# Patient Record
Sex: MALE | Race: WHITE | NOT HISPANIC OR LATINO | Employment: OTHER | URBAN - METROPOLITAN AREA
[De-identification: names, ages, dates, MRNs, and addresses within clinical notes are randomized per-mention and may not be internally consistent; named-entity substitution may affect disease eponyms.]

---

## 2018-01-23 ENCOUNTER — ALLSCRIPTS OFFICE VISIT (OUTPATIENT)
Dept: OTHER | Facility: OTHER | Age: 38
End: 2018-01-23

## 2018-01-23 ENCOUNTER — TRANSCRIBE ORDERS (OUTPATIENT)
Dept: ADMINISTRATIVE | Facility: HOSPITAL | Age: 38
End: 2018-01-23

## 2018-01-23 DIAGNOSIS — G40.409 CENTRENCEPHALIC EPILEPSY (HCC): ICD-10-CM

## 2018-01-23 DIAGNOSIS — Q03.1 DANDY-WALKER SYNDROME (HCC): Primary | ICD-10-CM

## 2018-01-23 DIAGNOSIS — Q04.6 CONGENITAL CEREBRAL CYST (HCC): ICD-10-CM

## 2018-01-23 DIAGNOSIS — R41.3 MEMORY LOSS: ICD-10-CM

## 2018-01-24 NOTE — PROGRESS NOTES
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)    Plan   Verl Clas cyst    · Follow-up visit in 6 months Evaluation and Treatment  Follow-up  Status: Hold For -    Scheduling  Requested for: [id number]   Ordered; For: Verl Clas cyst; Ordered By: Divya Rodriguez Performed:  Due: [id number]  Verl Clas cyst, Memory loss, Myoclonic seizure, Porencephalic cyst    · EEG AWAKE (ROUTINE); Status:Hold For - Scheduling; Requested EMW:[id number]; Perform:[facility]; UDM:[id number];SCDKUGZ; For:Dandy Walker cyst, Memory loss, Myoclonic seizure, Porencephalic cyst; Ordered By:Erma Subramanian;  Verl Clas cyst, Memory loss, Porencephalic cyst    · * MRI BRAIN WO CONTRAST; Status:Need Information - Financial Authorization; Requested YZN:[id number]; Perform:[facility]; RRM:[id number];ISDZHGA; For:Dandy Walker cyst, Memory loss, Porencephalic cyst; Ordered By:Erma Subramanian;  Memory loss    · Memantine HCl - 5 MG Oral Tablet; Take 1 tablet in morning and 1 tablet at bedtime   Rx By: Divya Rodriguez; Dispense: 0 Days ; #:60 Tablet; Refill: 3;For: Memory loss; LADAN = N; Verified Transmission to [email or web address] Last Updated By: System, [employer]; 1/23/2018 3:29:18 PM    Discussion/Summary   Discussion Summary:    - MRI of head without contrast   EEG ordered for her  Namenda 5mg twice a day He is trying to wean off of smoking  Talked about chantix but at this time he will try to wean off of it own his own  Chief Complaint   Chief Complaint Free Text Note Form: Patient presents today for a neurological follow up regarding memory loss      History of Present Illness   HPI: We had the pleasure of evaluating Herson Sergio in neurological follow up today  As you know he is a 38year old right handed male   He is here today with his father for evaluation by neurology for his Josel Edouard Cyst and headaches  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  Smoking:  He is smoking about pack a day for many years now  He was given chantex by her pcp but never took it as his father was worried about the side effects  He has noted change in weather will bring on a headache and taking Excedrin will abort the headaches  He has not had a severe headache for a while and last headaches was few weeks ago  It starts off from the occipital region and radiates to the frontal region  No nausea, light or sound sensitivity  It tends to occur in his shoulder and at time in his hands  It tends to occur at least 2-3 times a week or at time it may not occur for a while  He has noted it will last for seconds  Intermittent in nature with no precipitating factors  He has no confusion, no staring spells  At time father has noted it starts off with a myoclonus in his upper arm and moves to his hand  is good  No falls to report  He does have to write things down and has noted short term memory issues  But overall doing well  Father did have to help him out with his history today  Will start him on Namenda today  in his hands  That has improved since he was last seen  in 2009 showed mild median nerve entrapment  of brain: 2013 â  Large CSF signal cyst which communicates with the anterior right lateral ventricle measuring 10 cm in AP diameter and 6 9 cm in transverse diameter, suggestive of porencephalic cyst  The adjacent brain parenchyma demonstrates normal signal  This is nonspecific and could represent a remote vascular insult or developmental abnormality  2  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   The left cerebellar hemisphere is smaller than the right which may be related to adjacent arachnoid cyst  Findings suggest sequela of Dandy-Walker continuum, either hypoplastic vermis with rotation or persistent Jimi pouch cyst â 10/28/2013 - IMPRESSION Abnormal electroencephalogram due to the presence of:  Jaspal and wave discharges emanating from the right hemisphere  This occurs without any clinical warning  Background slowing and attenuation noted in the right hemisphere  Intermittent slowing also occurring in the central region  This study does demonstrate a focal abnormality involving the right hemisphere with superimposed swelling in that region  Review of Systems   Neurological ROS:      Constitutional: no fever, no chills, no recent weight gain, no recent weight loss, no complaints of feeling tired, no changes in appetite  HEENT:  no sinus problems, not feeling congested, no blurred vision, no dryness of the eyes, no eye pain, no hearing loss, no tinnitus, no mouth sores, no sore throat, no hoarseness, no dysphagia, no masses, no bleeding  Cardiovascular:  no chest pain or pressure, no palpitations present, the heart rate was not rapid or irregular, no swelling in the arms or legs, no poor circulation  Respiratory:  no unusual or persistant cough, no shortness of breath with or without exertion  Gastrointestinal:  no nausea, no vomiting, no diarrhea, no abdominal pain, no changes in bowel habits, no melena, no loss of bowel control  Genitourinary:  no incontinence, no feelings of urinary urgency, no increase in frequency, no urinary hesitancy, no dysuria, no hematuria  Musculoskeletal:  no arthralgias, no myalgias, no immobility or loss of function, no head/neck/back pain, no pain while walking  Integumentary  no masses, no rash, no skin lesions, no livedo reticularis  Psychiatric:  no anxiety, no depression, no mood swings, no psychiatric hospitalizations, no sleep problems  Endocrine   no unusual weight loss or gain, no excessive urination, no excessive thirst, no hair loss or gain, no hot or cold intolerance, no menstrual period change or irregularity, no loss of sexual ability or drive, no erection difficulty, no nipple discharge  Hematologic/Lymphatic:  no unusual bleeding, no tendency for easy bruising, no clotting skin or lumps  Neurological General:  no headache, no nausea or vomiting, no lightheadedness, no convulsions, no blackouts, no syncope, no trauma, no photopsia, no increased sleepiness, no trouble falling asleep, no snoring, no awakening at night  Neurological Mental Status:  no confusion, no mood swings, no alteration or loss of consciousness, no difficulty expressing/understanding speech, no memory problems  Neurological Cranial Nerves:  no blurry or double vision, no loss of vision, no face drooping, no facial numbness or weakness, no taste or smell loss/changes, no hearing loss or ringing, no vertigo or dizziness, no dysphagia, no slurred speech  Neurological Motor findings include: tremor  Neurological Coordination:  no unsteadiness, no vertigo or dizziness, no clumsiness, no problems reaching for objects  Neurological Sensory:  no numbness, no pain, no tingling, does not fall when eyes closed or taking a shower  Neurological Gait:  no difficulty walking, not falling to one side, no sensation of being pushed, has not had falls  ROS Reviewed:    ROS reviewed  Active Problems   1  Allergic rhinitis (477 9) (J30 9)   2  Asthma (493 90) (J45 909)   3  Naya Rodriguez cyst (742 3) (Q03 1)   4  Headache (784 0) (R51)   5  Hyperlipidemia (272 4) (E78 5)   6  Memory loss (780 93) (R41 3)   7  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)    Past Medical History   Active Problems And Past Medical History Reviewed: The active problems and past medical history were reviewed and updated today  Surgical History   1  History of Ankle Surgery   2  History of Eye Surgery   3  History of Oral Surgery Tooth Extraction  Surgical History Reviewed: The surgical history was reviewed and updated today         Family History   Mother 1  Family history of Classic Migraine (With Aura)   2  Family history of Diabetes Mellitus (V18 0)  Maternal Grandmother    3  Family history of Diabetes Mellitus (V18 0)   4  Family history of Heart Disease (V17 49)  Paternal Grandmother    5  Family history of Alzheimer Disease   6  Family history of Heart Disease (V17 49)  Paternal Grandfather    7  Family history of Heart Disease (V17 49)   8  Family history of Prostate Cancer (E24 11)  Family History Reviewed: The family history was reviewed and updated today  Social History    · Being A Social Drinker   · Current Every Day Smoker (305 1)   · Current Smoker (305 1)   · Daily Coffee Consumption (2  Cups/Day)   · Denied: History of Drug Use   · Marital History - Single  Social History Reviewed: The social history was reviewed and updated today  Current Meds    1  Cetirizine HCl - 10 MG Oral Tablet; Therapy: (Recorded:10Oct2013) to Recorded   2  Lovaza CAPS; Therapy: (Recorded:17Oct2014) to Recorded   3  Multi-Vitamin TABS; Therapy: (Recorded:10Oct2013) to Recorded   4  Synthroid TABS; patient is unaware of what mg they take  They do know they take 0 5     tablets every day; Therapy: (Recorded:20Nov2015) to Recorded   5  Vitamin D3 1000 UNIT Oral Tablet; Take 1 tablet twice daily; Therapy: (Recorded:17Oct2014) to Recorded   6  Welchol 625 MG Oral Tablet; TAKE 3 TABLETS TWICE DAILY; Therapy: (Recorded:10Oct2013) to Recorded  Medication List Reviewed: The medication list was reviewed and updated today  Allergies   1  No Known Drug Allergies  2   Bee sting    Vitals   Signs   Recorded: 42BIU2383 02:42PM   Heart Rate: 80, L Brachial Artery  Pulse Quality: Normal, L Brachial Artery  Respiration Quality: Normal  Respiration: 18  Systolic: 956, LUE, Sitting  Diastolic: 70, LUE, Sitting  Height: 5 ft 8 in  Weight: 210 lb 6 oz  BMI Calculated: 31 99  BSA Calculated: 2 09    Physical Exam        Constitutional      General appearance: No acute distress, well appearing and well nourished  -- Enlarged head circumference  Eyes      Ophthalmoscopic examination: Vision is grossly normal  Gross visual field testing by confrontation shows no abnormalities  EOMI in both eyes  Conjunctivae clear  Eyelids normal palpebral fissures equal  Orbits exhibit normal position  No discharge from the eyes  PERRL  Musculoskeletal      Gait and station: Normal gait, stance and balance  -- wide base gait  Mild difficulty with tandom gait  Muscle strength: Normal strength throughout  Muscle tone: No atrophy, abnormal movements, flaccidity, cogwheeling or spasticity  Neurologic      Orientation to person, place, and time: Normal  -- MOCA 17/30 11/20/2015, moca 1/23/2018 - 20/30  2nd cranial nerve: Normal        3rd, 4th, and 6th cranial nerve: Abnormal  -- (sustained nystagmus looking to all directions)      5th cranial nerve: Normal        7th cranial nerve: Normal        8th cranial nerve: Normal        9th cranial nerve: Normal        11th cranial nerve: Normal        12th cranial nerve: Normal        Sensation: Normal        Reflexes: Abnormal   Deep tendon reflexes: 3+ right biceps,-- 3+ left biceps,-- 3+ right brachioradialis,-- 3+ left brachioradialis,-- 3+ right patella-- and-- 3+ left patella2+ right ankle jerk-- and-- 2+ left ankle jerk        Coordination: Normal        Mood and affect: Normal        Signatures    Electronically signed by : Cierra Desouza MD; Jan 23 2018  3:35PM EST                       (Author)

## 2018-02-08 ENCOUNTER — HOSPITAL ENCOUNTER (OUTPATIENT)
Dept: MRI IMAGING | Facility: HOSPITAL | Age: 38
Discharge: HOME | End: 2018-02-08
Attending: PSYCHIATRY & NEUROLOGY
Payer: COMMERCIAL

## 2018-02-08 DIAGNOSIS — Q03.1 DANDY-WALKER SYNDROME (HCC): ICD-10-CM

## 2018-02-08 DIAGNOSIS — R41.3 MEMORY LOSS: ICD-10-CM

## 2018-02-08 DIAGNOSIS — Q04.6 CONGENITAL CEREBRAL CYST (HCC): ICD-10-CM

## 2018-02-08 PROCEDURE — 70551 MRI BRAIN STEM W/O DYE: CPT

## 2018-02-15 ENCOUNTER — HOSPITAL ENCOUNTER (OUTPATIENT)
Dept: NEUROLOGY | Facility: AMBULATORY SURGERY CENTER | Age: 38
Discharge: HOME | End: 2018-02-15
Payer: COMMERCIAL

## 2018-02-15 DIAGNOSIS — Q03.1 DANDY-WALKER SYNDROME (HCC): ICD-10-CM

## 2018-02-15 DIAGNOSIS — G40.409 CENTRENCEPHALIC EPILEPSY (HCC): ICD-10-CM

## 2018-02-15 DIAGNOSIS — R41.3 MEMORY LOSS: ICD-10-CM

## 2018-02-15 DIAGNOSIS — Q04.6 CONGENITAL CEREBRAL CYST (HCC): ICD-10-CM

## 2018-02-15 PROCEDURE — 95816 EEG AWAKE AND DROWSY: CPT | Performed by: PSYCHIATRY & NEUROLOGY

## 2018-02-15 PROCEDURE — 95816 EEG AWAKE AND DROWSY: CPT

## 2018-05-01 ENCOUNTER — TELEPHONE (OUTPATIENT)
Dept: NEUROLOGY | Facility: CLINIC | Age: 38
End: 2018-05-01

## 2018-08-01 RX ORDER — MEMANTINE HYDROCHLORIDE 5 MG/1
TABLET ORAL
COMMUNITY
Start: 2018-01-23 | End: 2021-11-29

## 2018-08-01 RX ORDER — GEMFIBROZIL 600 MG/1
600 TABLET, FILM COATED ORAL DAILY
COMMUNITY
Start: 2017-09-19

## 2018-08-01 RX ORDER — BIOTIN 1 MG
1 TABLET ORAL 2 TIMES DAILY
COMMUNITY

## 2018-08-01 RX ORDER — HYDROCODONE BITARTRATE AND ACETAMINOPHEN 5; 325 MG/1; MG/1
1-2 TABLET ORAL EVERY 6 HOURS
COMMUNITY

## 2018-08-01 RX ORDER — OMEGA-3-ACID ETHYL ESTERS 1 G/1
1 CAPSULE, LIQUID FILLED ORAL DAILY
COMMUNITY

## 2018-08-01 RX ORDER — CETIRIZINE HYDROCHLORIDE 10 MG/1
10 TABLET ORAL DAILY
COMMUNITY

## 2018-08-01 RX ORDER — FLUTICASONE PROPIONATE 50 MCG
2 SPRAY, SUSPENSION (ML) NASAL AS NEEDED
COMMUNITY

## 2018-08-01 RX ORDER — COLESEVELAM HYDROCHLORIDE 625 MG/1
3 TABLET, FILM COATED ORAL 2 TIMES DAILY
COMMUNITY

## 2018-08-01 RX ORDER — LEVOTHYROXINE SODIUM 137 UG/1
137 TABLET ORAL DAILY
COMMUNITY

## 2018-08-01 NOTE — PROGRESS NOTES
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  He is going to working every Tuesday  Smoking:   - He is smoking about pack a day for many years now  He was given chantex by her PCP but never took it as his father was worried about the side effects  Headaches:   - He has noted change in weather will bring on a headache and taking Excedrin will abort the headaches  He has not had a severe headache for a while and last headaches was few weeks ago  It starts off from the occipital region and radiates to the frontal region  No nausea, light or sound sensitivity  Myoclonus:   - It tends to occur in his shoulder and at time in his hands  It tends to occur at least 2-3 times a week or at time it may not occur for a while  He has noted it will last for seconds  Intermittent in nature with no precipitating factors  He has no confusion, no staring spells  At time father has noted it starts off with a myoclonus in his upper arm and moves to his hand  Balance is good  No falls to report  Memory:   - He does have to write things down and has noted short term memory issues  But overall doing well  Father did have to help him out with his history today  -  His insurance did not cover Namenda thus patient has not taken it  Is continued to have short-term memory problems  No new concerns  MRI of brain: 2013 - 1   Large CSF signal cyst which communicates with the anterior right lateral ventricle measuring 10 cm in AP diameter and 6 9 cm in transverse diameter, suggestive of porencephalic cyst  The adjacent brain parenchyma demonstrates normal signal  This is nonspecific and could represent a remote vascular insult or developmental abnormality  2  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  The left cerebellar hemisphere is smaller than the right which may be related to adjacent arachnoid cyst  Findings suggest sequela of Dandy-Walker continuum, either hypoplastic vermis with rotation or persistent Jimi pouch cyst     MRI of the brain 2018  IMPRESSION:   Stable MRI of the brain with porencephalic cyst communicating with the right lateral ventricle with asymmetric enlargement of the right cerebral hemisphere  This may be developmental or from remote prior vascular insult    Stable appearance of asymmetric size of the cerebellar hemispheres left smaller than right with left-sided arachnoid cyst noted  Hypoplasia of the cerebellar vermis noted suggesting Dandy-Walker variant or persistent Jimi pouch cyst        EEG - 10/28/2013 - IMPRESSION Abnormal electroencephalogram due to the presence of:   1  Jaspal and wave discharges emanating from the right hemisphere  This occurs without any clinical warning  2  Background slowing and attenuation noted in the right hemisphere  3  Intermittent slowing also occurring in the central region  This study does demonstrate a focal abnormality involving the right hemisphere with superimposed swelling in that region  The following portions of the patient's history were reviewed and updated as appropriate: allergies, current medications, past family history, past medical history, past social history, past surgical history and problem list     Objective:  Blood pressure 131/78, pulse 101, height 5' 8" (1 727 m), weight 95 3 kg (210 lb)      Physical Exam   Constitutional: He appears well-developed  HENT:    In asymmetry of the skull   Eyes: EOM are normal  Pupils are equal, round, and reactive to light  Neck: Normal range of motion  Cardiovascular: Normal rate  Pulmonary/Chest: Effort normal    Musculoskeletal: Normal range of motion  Neurological: He has normal strength  Coordination normal    Skin: Skin is warm  Psychiatric: He has a normal mood and affect  His speech is normal    Nursing note and vitals reviewed  Neurological Exam    Mental Status  The patient is alert  His speech is normal  His language is fluent with no aphasia  He has normal attention span and concentration  [place] 08/02/2018 22/30     Cranial Nerves    CN II: The patient's visual acuity and visual fields are normal   CN III, IV, VI: The patient's pupils are equally round and reactive to light and ocular movements are normal   CN V: The patient has normal facial sensation  CN VII:  The patient has symmetric facial movement  CN VIII:  The patient's hearing is normal   CN IX, X: The patient has symmetric palate movement and normal gag reflex  CN XI: The patient's shoulder shrug strength is normal   CN XII: The patient's tongue is midline without atrophy or fasciculations  (sustained nystagmus looking to all directions)      Motor   His strength is 5/5 throughout all four extremities  Sensory  The patient's sensation is normal in all four extremities  Reflexes   Upper extremity 3+/4   Lower extremity 3+/4     Gait and Coordination  The patient has normal casual, toe, heel, and tandem gait  He has normal coordination bilaterally  Wide-based gait, difficulty with tandem gait  ROS:  Review of Systems  Constitutional: Negative  HENT: Negative  Eyes: Negative  Respiratory: Negative  Cardiovascular: Negative  Gastrointestinal: Negative  Endocrine: Negative  Genitourinary: Negative  Musculoskeletal: Negative  Skin: Negative  Allergic/Immunologic: Negative  Neurological: Positive for tremors (occasionally in upper back )  Hematological: Negative      Psychiatric/Behavioral: Negative

## 2018-08-02 ENCOUNTER — OFFICE VISIT (OUTPATIENT)
Dept: NEUROLOGY | Facility: CLINIC | Age: 38
End: 2018-08-02
Payer: COMMERCIAL

## 2018-08-02 VITALS
SYSTOLIC BLOOD PRESSURE: 131 MMHG | BODY MASS INDEX: 31.83 KG/M2 | HEART RATE: 101 BPM | HEIGHT: 68 IN | DIASTOLIC BLOOD PRESSURE: 78 MMHG | WEIGHT: 210 LBS

## 2018-08-02 DIAGNOSIS — Q04.6 PORENCEPHALIC CYST, CONGENITAL (HCC): ICD-10-CM

## 2018-08-02 DIAGNOSIS — G25.3 MYOCLONUS: ICD-10-CM

## 2018-08-02 DIAGNOSIS — R41.3 MEMORY DIFFICULTY: Primary | ICD-10-CM

## 2018-08-02 DIAGNOSIS — Q03.1 DANDY WALKER MALFORMATION (HCC): ICD-10-CM

## 2018-08-02 PROCEDURE — 99213 OFFICE O/P EST LOW 20 MIN: CPT | Performed by: PSYCHIATRY & NEUROLOGY

## 2018-08-02 NOTE — PROGRESS NOTES
Patient ID: Rosa Rey is a 43 y o  male  Assessment/Plan:    No problem-specific Assessment & Plan notes found for this encounter  {Assess/PlanSmartLinks:18896}       Subjective:    HPI    {[facility] HPI texts:64291}    {Common ambulatory SmartLinks:55738}         Objective:    Blood pressure 131/78, pulse 101, height 5' 8" (1 727 m), weight 95 3 kg (210 lb)  Physical Exam    Neurological Exam      ROS:    Review of Systems   Constitutional: Negative  HENT: Negative  Eyes: Negative  Respiratory: Negative  Cardiovascular: Negative  Gastrointestinal: Negative  Endocrine: Negative  Genitourinary: Negative  Musculoskeletal: Negative  Skin: Negative  Allergic/Immunologic: Negative  Neurological: Positive for tremors (occasionally in upper back )  Hematological: Negative  Psychiatric/Behavioral: Negative

## 2020-10-01 ENCOUNTER — OFFICE VISIT (OUTPATIENT)
Dept: NEUROLOGY | Facility: CLINIC | Age: 40
End: 2020-10-01
Payer: COMMERCIAL

## 2020-10-01 VITALS
TEMPERATURE: 97.9 F | HEIGHT: 68 IN | DIASTOLIC BLOOD PRESSURE: 78 MMHG | HEART RATE: 90 BPM | WEIGHT: 209 LBS | BODY MASS INDEX: 31.67 KG/M2 | SYSTOLIC BLOOD PRESSURE: 134 MMHG

## 2020-10-01 DIAGNOSIS — R41.3 MEMORY DIFFICULTY: ICD-10-CM

## 2020-10-01 DIAGNOSIS — Z72.0 TOBACCO USE: ICD-10-CM

## 2020-10-01 DIAGNOSIS — Q03.1 DANDY WALKER MALFORMATION (HCC): Primary | ICD-10-CM

## 2020-10-01 DIAGNOSIS — Q04.6 PORENCEPHALIC CYST, CONGENITAL (HCC): ICD-10-CM

## 2020-10-01 DIAGNOSIS — G25.3 MYOCLONUS: ICD-10-CM

## 2020-10-01 PROCEDURE — 99214 OFFICE O/P EST MOD 30 MIN: CPT | Performed by: PSYCHIATRY & NEUROLOGY

## 2020-10-23 ENCOUNTER — HOSPITAL ENCOUNTER (OUTPATIENT)
Dept: MRI IMAGING | Facility: HOSPITAL | Age: 40
Discharge: HOME | End: 2020-10-23
Attending: PSYCHIATRY & NEUROLOGY
Payer: COMMERCIAL

## 2020-10-23 DIAGNOSIS — Q03.1 DANDY WALKER MALFORMATION (HCC): ICD-10-CM

## 2020-10-23 PROCEDURE — G1004 CDSM NDSC: HCPCS

## 2020-10-23 PROCEDURE — 70553 MRI BRAIN STEM W/O & W/DYE: CPT

## 2020-10-23 PROCEDURE — A9585 GADOBUTROL INJECTION: HCPCS | Performed by: PSYCHIATRY & NEUROLOGY

## 2020-10-23 RX ADMIN — GADOBUTROL 9 ML: 604.72 INJECTION INTRAVENOUS at 13:02

## 2021-03-05 ENCOUNTER — TELEPHONE (OUTPATIENT)
Dept: NEUROLOGY | Facility: CLINIC | Age: 41
End: 2021-03-05

## 2021-03-05 NOTE — TELEPHONE ENCOUNTER
Received a vm from 3/4/21 at 4:23    Stephanie Calvin calling in requesting to speak to Dr Khloe Chavez  [phone]        Called and spoke to Stephanie Calvin  She received letter that Dr Khloe Chavez is leaving the office  I made her aware that we could reschedule all appts with other providers in our office   She verbalized understanding and will try to find out where Dr Khloe Chavez is going to

## 2021-05-10 ENCOUNTER — TELEPHONE (OUTPATIENT)
Dept: NEUROLOGY | Facility: CLINIC | Age: 41
End: 2021-05-10

## 2021-05-10 NOTE — TELEPHONE ENCOUNTER
Patient's mother, Edward Leger called to ask  for excuse from jury duty for patient  (Dr Digna Bañuelos patient scheduled with you next on 11/29 for f/u)  He has hx of porencephalic cyst, short term memory difficulties and headaches  She said due to his mental status, he will not be able to serve as a juror  Is it okay to write letter stating that he is unable to perform jury duty due to disability indefinitely as this is chronic condition  Please advise, thank you

## 2021-05-11 NOTE — TELEPHONE ENCOUNTER
I reviewed his prior note, agree that jury duty would not be appropriate for him, ok to generate  Thanks

## 2021-11-19 ENCOUNTER — TELEPHONE (OUTPATIENT)
Dept: NEUROLOGY | Facility: CLINIC | Age: 41
End: 2021-11-19

## 2021-11-29 ENCOUNTER — OFFICE VISIT (OUTPATIENT)
Dept: NEUROLOGY | Facility: CLINIC | Age: 41
End: 2021-11-29
Payer: COMMERCIAL

## 2021-11-29 VITALS
TEMPERATURE: 97.4 F | DIASTOLIC BLOOD PRESSURE: 75 MMHG | HEIGHT: 68 IN | SYSTOLIC BLOOD PRESSURE: 140 MMHG | HEART RATE: 94 BPM | WEIGHT: 199.5 LBS | BODY MASS INDEX: 30.23 KG/M2

## 2021-11-29 DIAGNOSIS — G25.3 MYOCLONUS: ICD-10-CM

## 2021-11-29 DIAGNOSIS — R41.3 MEMORY DIFFICULTY: ICD-10-CM

## 2021-11-29 DIAGNOSIS — Q04.6 PORENCEPHALIC CYST, CONGENITAL (HCC): ICD-10-CM

## 2021-11-29 DIAGNOSIS — Q03.1 DANDY WALKER MALFORMATION (HCC): Primary | ICD-10-CM

## 2021-11-29 DIAGNOSIS — Z87.891 FORMER SMOKER: ICD-10-CM

## 2021-11-29 DIAGNOSIS — H93.11 TINNITUS OF RIGHT EAR: ICD-10-CM

## 2021-11-29 PROCEDURE — 99213 OFFICE O/P EST LOW 20 MIN: CPT | Performed by: PSYCHIATRY & NEUROLOGY

## 2024-08-27 ENCOUNTER — OFFICE VISIT (OUTPATIENT)
Dept: NEUROLOGY | Facility: CLINIC | Age: 44
End: 2024-08-27
Payer: COMMERCIAL

## 2024-08-27 VITALS
BODY MASS INDEX: 31.4 KG/M2 | OXYGEN SATURATION: 96 % | SYSTOLIC BLOOD PRESSURE: 134 MMHG | HEIGHT: 68 IN | WEIGHT: 207.2 LBS | HEART RATE: 90 BPM | DIASTOLIC BLOOD PRESSURE: 78 MMHG | TEMPERATURE: 98.2 F

## 2024-08-27 DIAGNOSIS — Q03.1 DANDY WALKER MALFORMATION (HCC): ICD-10-CM

## 2024-08-27 DIAGNOSIS — G47.19 EXCESSIVE DAYTIME SLEEPINESS: ICD-10-CM

## 2024-08-27 DIAGNOSIS — G25.3 MYOCLONUS: ICD-10-CM

## 2024-08-27 DIAGNOSIS — F17.200 SMOKER: ICD-10-CM

## 2024-08-27 DIAGNOSIS — R06.83 SNORING: ICD-10-CM

## 2024-08-27 DIAGNOSIS — R41.3 MEMORY DIFFICULTY: Primary | ICD-10-CM

## 2024-08-27 DIAGNOSIS — Q04.6 PORENCEPHALIC CYST, CONGENITAL (HCC): ICD-10-CM

## 2024-08-27 PROCEDURE — 99213 OFFICE O/P EST LOW 20 MIN: CPT | Performed by: PSYCHIATRY & NEUROLOGY

## 2024-08-27 NOTE — PROGRESS NOTES
Patient ID: Tiago Zamorano is a 44 y.o. male.    Assessment/Plan:   Patient Instructions   Memory challenges: Luis presents for a follow-up evaluation with regard to memory difficulty which may be incident to the Dandy-Walker malformation/porencephalic cyst.  He reports no new challenges at this time.  He continues to be relatively independent, he is driving and working.  -He can continue to use vitamin supplements according to package instructions at his discretion but he should avoid mixing multiple supplements without talking to a pharmacist or nutritionist  -I would recommend he pursue a Mediterranean style diet as this can help support memory long-term  -He should remain physically active and ideally we would recommend cardiovascular exercise several days per week  -He reports a history of snoring and some nonrestorative sleep/excessive daytime sleepiness.  [place] this in mind I will request a home sleep study for him.  If this were to confirm sleep apnea I would refer him to see one of the sleep medicine doctors  -Unfortunately he has returned to smoking but we discussed this in the office today.  He should work on quitting smoking as soon as reasonably possible  -He has intermittent shakes/myoclonic jerks which are not bothersome to him    I will plan for him to return to the office to see me directly in 12 months but we would be happy to see him sooner if the need should arise.  At the time of his follow-up visit we will consider and plan when he will next require MRI imaging.  No problem-specific Assessment & Plan notes found for this encounter.       Diagnoses and all orders for this visit:    Memory difficulty  -     Ambulatory Referral to Sleep Medicine; Future    Snoring  -     Ambulatory Referral to Sleep Medicine; Future    Excessive daytime sleepiness  -     Ambulatory Referral to Sleep Medicine; Future    Smoker    Dandy Walker malformation (HCC)    Porencephalic cyst, congenital  "(HCC)    Myoclonus           Subjective:    HPI    HISTORY AS PER (PT/Family Member):       COGNITION:     Difficulty finding the right word while speaking: No  Requires repeat information or asking the same question repeatedly: No  Fluctuation in alertness: No  Changes in mood or personality:No  Current or previous treatment for depression or anxiety: No     Family member with dementia and what type? Yes Pat Grandmother  History of head trauma: No  Dandy Walker  History of alcohol or substance abuse: No     FUNCTIONAL STATUS:  BADLs  Does patient require assistance with:    Bathing: No  Dressing: No  Transferring: No  Continence: No  Toileting: No  Feeding: No     IADLs  Dose patient require assistance with:    Telephone: No  Shopping: No  Food Preparation: No  Housekeeping: No  Laundry: +/-  Transportation: No  Medications: No  Finances: Yes      The patient presents with his father for a follow-up evaluation.  They note that he is doing reasonably well.  He [place] a part-time job and drives.  He lives independently in an apartment at his parents home.  He reports challenges related to pain in his wrist on the right-hand side.    He does report some sleep issues.  His father confirms that he snores significantly.  He notes nonrefreshing sleep and excessive daytime sleepiness.  Bearing this is by and considering his cognitive challenges there is concern for obstructive sleep apnea.       Objective:    Blood pressure 134/78, pulse 90, temperature 98.2 °F (36.8 °C), temperature source Temporal, height 5' 8\" (1.727 m), weight 94 kg (207 lb 3.2 oz), SpO2 96%.    Physical Exam    Neurological Exam    At the time of my examination he was awake and alert and in no distress.  He is a moderate historian, perhaps slightly childlike.  There is minimal macrocephaly.  Cranial nerves II through XII are symmetrically intact.  There is no focal or lateralizing weakness.  He is able to rise easily without assistance and his gait " is stable.

## 2024-08-27 NOTE — PATIENT INSTRUCTIONS
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  He reports no new challenges at this time.  He continues to be relatively independent, he is driving and working.  -He can continue to use vitamin supplements according to package instructions at his discretion but he should avoid mixing multiple supplements without talking to a pharmacist or nutritionist  -I would recommend he pursue a Mediterranean style diet as this can help support memory long-term  -He should remain physically active and ideally we would recommend cardiovascular exercise several days per week  -He reports a history of snoring and some nonrestorative sleep/excessive daytime sleepiness.  [place] this in mind I will request a home sleep study for him.  If this were to confirm sleep apnea I would refer him to see one of the sleep medicine doctors  -Unfortunately he has returned to smoking but we discussed this in the office today.  He should work on quitting smoking as soon as reasonably possible  -He has intermittent shakes/myoclonic jerks which are not bothersome to him    I will plan for him to return to the office to see me directly in 12 months but we would be happy to see him sooner if the need should arise.  At the time of his follow-up visit we will consider and plan when he will next require MRI imaging.

## 2024-08-27 NOTE — PROGRESS NOTES
Review of Systems   Constitutional:  Negative for appetite change, fatigue and fever.   HENT: Negative.  Negative for hearing loss, tinnitus, trouble swallowing and voice change.    Eyes: Negative.  Negative for photophobia, pain and visual disturbance.   Respiratory: Negative.  Negative for shortness of breath.    Cardiovascular: Negative.  Negative for palpitations.   Gastrointestinal: Negative.  Negative for nausea and vomiting.   Endocrine: Negative.  Negative for cold intolerance.   Genitourinary: Negative.  Negative for dysuria, frequency and urgency.   Musculoskeletal:  Negative for back pain, gait problem, myalgias, neck pain and neck stiffness.   Skin: Negative.  Negative for rash.   Allergic/Immunologic: Negative.    Neurological: Negative.  Negative for dizziness, tremors, seizures, syncope, facial asymmetry, speech difficulty, weakness, light-headedness, numbness and headaches.   Hematological: Negative.  Does not bruise/bleed easily.   Psychiatric/Behavioral: Negative.  Negative for confusion, hallucinations and sleep disturbance.    All other systems reviewed and are negative.       details… Regular rate & rhythm, normal S1, S2; no murmurs, gallops or rubs; no S3, S4

## 2024-09-06 ENCOUNTER — TRANSCRIBE ORDERS (OUTPATIENT)
Dept: SLEEP CENTER | Facility: CLINIC | Age: 44
End: 2024-09-06

## 2024-09-06 DIAGNOSIS — G47.19 EXCESSIVE DAYTIME SLEEPINESS: Primary | ICD-10-CM

## 2024-09-06 DIAGNOSIS — R06.83 SNORING: ICD-10-CM

## 2024-09-06 DIAGNOSIS — R41.3 MEMORY DIFFICULTY: ICD-10-CM

## 2025-07-09 ENCOUNTER — TELEPHONE (OUTPATIENT)
Dept: NEUROLOGY | Facility: CLINIC | Age: 45
End: 2025-07-09

## 2025-07-09 NOTE — TELEPHONE ENCOUNTER
Called and LVM for pt regarding r/s August appt due to provider not being in office. Offered open slots in September. If pt calls back in, pls offer any open slot in the week of 09/22 and let me know which they decide. Thanks!